# Patient Record
Sex: FEMALE | URBAN - METROPOLITAN AREA
[De-identification: names, ages, dates, MRNs, and addresses within clinical notes are randomized per-mention and may not be internally consistent; named-entity substitution may affect disease eponyms.]

---

## 2020-05-21 PROBLEM — 266435005 GASTRO-ESOPHAGEAL REFLUX DISEASE WITHOUT ESOPHAGITIS: Status: ACTIVE | Noted: 2020-05-21

## 2022-04-30 ENCOUNTER — TELEPHONE ENCOUNTER (OUTPATIENT)
Dept: URBAN - METROPOLITAN AREA CLINIC 121 | Facility: CLINIC | Age: 57
End: 2022-04-30

## 2022-05-01 ENCOUNTER — TELEPHONE ENCOUNTER (OUTPATIENT)
Dept: URBAN - METROPOLITAN AREA CLINIC 121 | Facility: CLINIC | Age: 57
End: 2022-05-01

## 2022-05-01 RX ORDER — PANTOPRAZOLE SODIUM 40 MG/1
1 TABLET PO QD TABLET, DELAYED RELEASE ORAL
Status: ACTIVE | COMMUNITY
Start: 2020-05-22

## 2023-02-13 ENCOUNTER — WEB ENCOUNTER (OUTPATIENT)
Dept: URBAN - METROPOLITAN AREA CLINIC 50 | Facility: CLINIC | Age: 58
End: 2023-02-13

## 2023-02-13 ENCOUNTER — LAB OUTSIDE AN ENCOUNTER (OUTPATIENT)
Dept: URBAN - METROPOLITAN AREA CLINIC 50 | Facility: CLINIC | Age: 58
End: 2023-02-13

## 2023-02-13 ENCOUNTER — OFFICE VISIT (OUTPATIENT)
Dept: URBAN - METROPOLITAN AREA CLINIC 50 | Facility: CLINIC | Age: 58
End: 2023-02-13
Payer: COMMERCIAL

## 2023-02-13 VITALS
HEIGHT: 64 IN | TEMPERATURE: 97 F | WEIGHT: 206 LBS | SYSTOLIC BLOOD PRESSURE: 160 MMHG | BODY MASS INDEX: 35.17 KG/M2 | DIASTOLIC BLOOD PRESSURE: 72 MMHG | HEART RATE: 71 BPM

## 2023-02-13 DIAGNOSIS — Z12.11 COLON CANCER SCREENING: ICD-10-CM

## 2023-02-13 DIAGNOSIS — R16.0 HEPATOMEGALY: ICD-10-CM

## 2023-02-13 DIAGNOSIS — Z83.71 FAMILY HISTORY OF COLONIC POLYPS: ICD-10-CM

## 2023-02-13 DIAGNOSIS — K63.89 EPIPLOIC APPENDAGITIS: ICD-10-CM

## 2023-02-13 DIAGNOSIS — R10.84 GENERALIZED ABDOMINAL PAIN: ICD-10-CM

## 2023-02-13 PROBLEM — 429969003 FAMILY HISTORY OF POLYP OF COLON: Status: ACTIVE | Noted: 2020-05-21

## 2023-02-13 PROCEDURE — 99204 OFFICE O/P NEW MOD 45 MIN: CPT

## 2023-02-13 RX ORDER — IBUPROFEN 800 MG/1
1 TABLET WITH FOOD OR MILK AS NEEDED TABLET, FILM COATED ORAL
Qty: 20 | Refills: 0 | OUTPATIENT
Start: 2023-02-13 | End: 2023-02-23

## 2023-02-13 RX ORDER — PANTOPRAZOLE SODIUM 40 MG/1
1 TABLET PO QD TABLET, DELAYED RELEASE ORAL
Status: ACTIVE | COMMUNITY
Start: 2020-05-22

## 2023-02-13 RX ORDER — SODIUM, POTASSIUM,MAG SULFATES 17.5-3.13G
354ML SOLUTION, RECONSTITUTED, ORAL ORAL
Qty: 1 | Refills: 0 | OUTPATIENT
Start: 2023-02-13 | End: 2023-02-15

## 2023-02-13 RX ORDER — PANTOPRAZOLE SODIUM 20 MG/1
1 TABLET TABLET, DELAYED RELEASE ORAL ONCE A DAY
Qty: 10 TABLET | Refills: 0 | OUTPATIENT
Start: 2023-02-13

## 2023-02-13 NOTE — PHYSICAL EXAM GASTROINTESTINAL
Abdomen soft, tender throughout the abdomen, nondistended, no masses palpable, no guarding or rigidity, normal bowel sounds, Liver and Spleen no hepatosplenomegaly, Rectal deferred

## 2023-02-13 NOTE — PHYSICAL EXAM CONSTITUTIONAL:
well developed, well nourished, patient is leaning over in pain, ambulating without difficulty, normal communication ability

## 2023-02-27 ENCOUNTER — OFFICE VISIT (OUTPATIENT)
Dept: URBAN - METROPOLITAN AREA CLINIC 12 | Facility: CLINIC | Age: 58
End: 2023-02-27

## 2023-02-27 RX ORDER — PANTOPRAZOLE SODIUM 40 MG/1
1 TABLET PO QD TABLET, DELAYED RELEASE ORAL
Status: ACTIVE | COMMUNITY
Start: 2020-05-22

## 2023-02-27 RX ORDER — PANTOPRAZOLE SODIUM 20 MG/1
1 TABLET TABLET, DELAYED RELEASE ORAL ONCE A DAY
Qty: 10 TABLET | Refills: 0 | Status: ACTIVE | COMMUNITY
Start: 2023-02-13

## 2023-02-27 NOTE — HPI-TODAY'S VISIT:
58 yo F here for evaluation of abd pain. She saw Elana Enciso PA-C 2 weeks ago for similar Sx. Upper abd pain. She was prescribed Ibuprofen 800mg PO BID Also was scheduled for first screening colonoscopy, scheduled for mid March with Dr. Avelar.

## 2023-03-01 ENCOUNTER — TELEPHONE ENCOUNTER (OUTPATIENT)
Dept: URBAN - METROPOLITAN AREA CLINIC 50 | Facility: CLINIC | Age: 58
End: 2023-03-01

## 2023-03-01 ENCOUNTER — OFFICE VISIT (OUTPATIENT)
Dept: URBAN - METROPOLITAN AREA CLINIC 12 | Facility: CLINIC | Age: 58
End: 2023-03-01

## 2023-03-08 ENCOUNTER — TELEPHONE ENCOUNTER (OUTPATIENT)
Dept: URBAN - METROPOLITAN AREA CLINIC 50 | Facility: CLINIC | Age: 58
End: 2023-03-08

## 2023-03-08 ENCOUNTER — ERX REFILL RESPONSE (OUTPATIENT)
Dept: URBAN - METROPOLITAN AREA CLINIC 50 | Facility: CLINIC | Age: 58
End: 2023-03-08

## 2023-03-08 RX ORDER — SODIUM, POTASSIUM,MAG SULFATES 17.5-3.13G
354ML SOLUTION, RECONSTITUTED, ORAL ORAL
Qty: 1 | Refills: 0 | OUTPATIENT

## 2023-03-15 ENCOUNTER — OFFICE VISIT (OUTPATIENT)
Dept: URBAN - METROPOLITAN AREA SURGERY CENTER 18 | Facility: SURGERY CENTER | Age: 58
End: 2023-03-15
Payer: COMMERCIAL

## 2023-03-15 ENCOUNTER — CLAIMS CREATED FROM THE CLAIM WINDOW (OUTPATIENT)
Dept: URBAN - METROPOLITAN AREA CLINIC 4 | Facility: CLINIC | Age: 58
End: 2023-03-15
Payer: COMMERCIAL

## 2023-03-15 DIAGNOSIS — D12.5 ADENOMA OF SIGMOID COLON: ICD-10-CM

## 2023-03-15 DIAGNOSIS — D12.3 ADENOMA OF TRANSVERSE COLON: ICD-10-CM

## 2023-03-15 DIAGNOSIS — Z12.11 COLON CANCER SCREENING: ICD-10-CM

## 2023-03-15 DIAGNOSIS — K63.89 OTHER SPECIFIED DISEASES OF INTESTINE: ICD-10-CM

## 2023-03-15 PROCEDURE — G8907 PT DOC NO EVENTS ON DISCHARG: HCPCS | Performed by: INTERNAL MEDICINE

## 2023-03-15 PROCEDURE — 45380 COLONOSCOPY AND BIOPSY: CPT | Performed by: INTERNAL MEDICINE

## 2023-03-15 PROCEDURE — 88305 TISSUE EXAM BY PATHOLOGIST: CPT | Performed by: PATHOLOGY

## 2023-03-15 RX ORDER — PANTOPRAZOLE SODIUM 40 MG/1
1 TABLET PO QD TABLET, DELAYED RELEASE ORAL
Status: ACTIVE | COMMUNITY
Start: 2020-05-22

## 2023-03-15 RX ORDER — PANTOPRAZOLE SODIUM 20 MG/1
1 TABLET TABLET, DELAYED RELEASE ORAL ONCE A DAY
Qty: 10 TABLET | Refills: 0 | Status: ACTIVE | COMMUNITY
Start: 2023-02-13

## 2023-03-15 RX ORDER — SODIUM, POTASSIUM,MAG SULFATES 17.5-3.13G
354ML SOLUTION, RECONSTITUTED, ORAL ORAL
Qty: 1 | Refills: 0 | Status: ACTIVE | COMMUNITY

## 2023-03-30 ENCOUNTER — DASHBOARD ENCOUNTERS (OUTPATIENT)
Age: 58
End: 2023-03-30

## 2023-03-30 ENCOUNTER — OFFICE VISIT (OUTPATIENT)
Dept: URBAN - METROPOLITAN AREA CLINIC 50 | Facility: CLINIC | Age: 58
End: 2023-03-30
Payer: COMMERCIAL

## 2023-03-30 VITALS
HEIGHT: 64 IN | TEMPERATURE: 97.6 F | HEART RATE: 79 BPM | WEIGHT: 208 LBS | DIASTOLIC BLOOD PRESSURE: 80 MMHG | BODY MASS INDEX: 35.51 KG/M2 | SYSTOLIC BLOOD PRESSURE: 144 MMHG

## 2023-03-30 DIAGNOSIS — R12 HEARTBURN: ICD-10-CM

## 2023-03-30 DIAGNOSIS — Z83.71 FAMILY HISTORY OF COLONIC POLYPS: ICD-10-CM

## 2023-03-30 DIAGNOSIS — D36.9 TUBULAR ADENOMA: ICD-10-CM

## 2023-03-30 DIAGNOSIS — R10.13 EPIGASTRIC ABDOMINAL PAIN: ICD-10-CM

## 2023-03-30 DIAGNOSIS — R16.0 HEPATOMEGALY: ICD-10-CM

## 2023-03-30 DIAGNOSIS — R10.84 GENERALIZED ABDOMINAL PAIN: ICD-10-CM

## 2023-03-30 DIAGNOSIS — K63.89 EPIPLOIC APPENDAGITIS: ICD-10-CM

## 2023-03-30 PROCEDURE — 99213 OFFICE O/P EST LOW 20 MIN: CPT

## 2023-03-30 RX ORDER — FAMOTIDINE 40 MG/1
1 TABLET AT BEDTIME TABLET, FILM COATED ORAL ONCE A DAY
Qty: 30 TABLET | Refills: 5 | OUTPATIENT
Start: 2023-03-30

## 2023-03-30 NOTE — HPI-TODAY'S VISIT:
56 yo F presents to the clinic for fu  Took ibuprofen and ppi bid for 10 days  Feels better  But slowly came back  + abdominal pain - epigastric  + nausea - infrequent  No vomiting  Fluctuating bowels - since colonoscopy  + diarrhea  + constipation  No BRBPR, no melena  + heartburn + reflux

## 2024-08-30 ENCOUNTER — OFFICE VISIT (OUTPATIENT)
Dept: URBAN - METROPOLITAN AREA CLINIC 96 | Facility: CLINIC | Age: 59
End: 2024-08-30

## 2024-08-30 VITALS
DIASTOLIC BLOOD PRESSURE: 80 MMHG | HEIGHT: 64 IN | BODY MASS INDEX: 28.17 KG/M2 | WEIGHT: 165 LBS | HEART RATE: 66 BPM | TEMPERATURE: 98.1 F | SYSTOLIC BLOOD PRESSURE: 160 MMHG

## 2024-08-30 NOTE — HPI-SCREENING
56 yo F presents to the clinic for abdoninal pain for te past 5 days  + abdominal pain - since 2/9/23, pain is un the upper central abdomen Pain is like a pressure sensation - had her doubled over  + nasuea, no vomiting  Pain relieved by leaning over chair  Laying down does not help pain  Staying very still helps  Pain increased by drinking water No dyspahgia  Bowels - moving normally  No BRBPR, no melena Went to [facility]gent [facility] 2/10/23 (had CT, and was given injection) CT scan: oeppiglotic appendigitis, hepatomegaly  Was given oral cooktail at Urgent Care - maded her constipated last colon: neverr

## 2024-08-30 NOTE — HPI-TODAY'S VISIT:
takes ibuprofen 800  mg, 2 pills most days of the week if not daily for past few weeks. has been having abodminal pain for 2 weeks epigastric pain no melena takes ibuprofen for abodminal pain    ======================================== 56 yo F presents to the clinic for fu  Took ibuprofen and ppi bid for 10 days  Feels better  But slowly came back  + abdominal pain - epigastric  + nausea - infrequent  No vomiting  Fluctuating bowels - since colonoscopy  + diarrhea  + constipation  No BRBPR, no melena  + heartburn + reflux 56 yo F here for evaluation of abd pain. She saw Elana Enciso PA-C 2 weeks ago for similar Sx. Upper abd pain. She was prescribed Ibuprofen 800mg PO BID Also was scheduled for first screening colonoscopy, scheduled for mid March with Dr. Avelar.

## 2024-09-03 ENCOUNTER — TELEPHONE ENCOUNTER (OUTPATIENT)
Dept: URBAN - METROPOLITAN AREA CLINIC 96 | Facility: CLINIC | Age: 59
End: 2024-09-03

## 2024-09-03 ENCOUNTER — OFFICE VISIT (OUTPATIENT)
Dept: URBAN - METROPOLITAN AREA MEDICAL CENTER 28 | Facility: MEDICAL CENTER | Age: 59
End: 2024-09-03
Payer: COMMERCIAL

## 2024-09-03 DIAGNOSIS — K29.50 CHRONIC GASTRITIS: ICD-10-CM

## 2024-09-03 DIAGNOSIS — R10.13 ABDOMINAL PAIN, EPIGASTRIC: ICD-10-CM

## 2024-09-03 PROCEDURE — 43239 EGD BIOPSY SINGLE/MULTIPLE: CPT | Performed by: INTERNAL MEDICINE

## 2024-09-16 ENCOUNTER — TELEPHONE ENCOUNTER (OUTPATIENT)
Dept: URBAN - METROPOLITAN AREA CLINIC 96 | Facility: CLINIC | Age: 59
End: 2024-09-16

## 2024-09-16 PROBLEM — 8493009: Status: ACTIVE | Noted: 2024-09-16

## 2024-09-19 ENCOUNTER — TELEPHONE ENCOUNTER (OUTPATIENT)
Dept: URBAN - METROPOLITAN AREA CLINIC 96 | Facility: CLINIC | Age: 59
End: 2024-09-19

## 2024-09-23 ENCOUNTER — TELEPHONE ENCOUNTER (OUTPATIENT)
Dept: URBAN - METROPOLITAN AREA CLINIC 96 | Facility: CLINIC | Age: 59
End: 2024-09-23

## 2024-09-24 LAB — H PYLORI BREATH TEST: NOT DETECTED

## 2024-10-04 ENCOUNTER — OFFICE VISIT (OUTPATIENT)
Dept: URBAN - METROPOLITAN AREA TELEHEALTH 2 | Facility: TELEHEALTH | Age: 59
End: 2024-10-04
Payer: COMMERCIAL

## 2024-10-04 VITALS — WEIGHT: 170 LBS | HEIGHT: 64 IN | BODY MASS INDEX: 29.02 KG/M2 | RESPIRATION RATE: 18 BRPM

## 2024-10-04 DIAGNOSIS — R10.13 EPIGASTRIC ABDOMINAL PAIN: ICD-10-CM

## 2024-10-04 DIAGNOSIS — K63.89 EPIPLOIC APPENDAGITIS: ICD-10-CM

## 2024-10-04 DIAGNOSIS — R10.84 GENERALIZED ABDOMINAL PAIN: ICD-10-CM

## 2024-10-04 DIAGNOSIS — K31.A0 GASTRIC INTESTINAL METAPLASIA: ICD-10-CM

## 2024-10-04 DIAGNOSIS — D36.9 TUBULAR ADENOMA: ICD-10-CM

## 2024-10-04 DIAGNOSIS — K31.89 EROSIVE GASTROPATHY: ICD-10-CM

## 2024-10-04 PROBLEM — 413216002: Status: ACTIVE | Noted: 2024-10-04

## 2024-10-04 PROBLEM — 102614006: Status: ACTIVE | Noted: 2024-10-04

## 2024-10-04 PROBLEM — 116289008: Status: ACTIVE | Noted: 2024-10-04

## 2024-10-04 PROBLEM — 79922009: Status: ACTIVE | Noted: 2024-10-04

## 2024-10-04 PROBLEM — 14720231000119109: Status: ACTIVE | Noted: 2024-10-04

## 2024-10-04 PROBLEM — 444408007: Status: ACTIVE | Noted: 2024-10-04

## 2024-10-04 PROBLEM — 72519002: Status: ACTIVE | Noted: 2024-10-04

## 2024-10-04 PROBLEM — 16331000: Status: ACTIVE | Noted: 2024-10-04

## 2024-10-04 PROCEDURE — 99214 OFFICE O/P EST MOD 30 MIN: CPT

## 2024-10-04 RX ORDER — VONOPRAZAN FUMARATE 26.72 MG/1
1 TABLET TABLET ORAL ONCE A DAY
Qty: 30 | Refills: 11 | OUTPATIENT
Start: 2024-10-04 | End: 2025-09-29

## 2024-10-04 NOTE — HPI-OTHER HISTORIES
Previously 8/2024 with Dr. Garcia: takes ibuprofen 800  mg, 2 pills most days of the week if not daily for past few weeks. has been having abodminal pain for 2 weeks epigastric pain no melena takes ibuprofen for abodminal pain  ======================================== 58 yo F presents to the clinic for fu Took ibuprofen and ppi bid for 10 days Feels better But slowly came back + abdominal pain - epigastric + nausea - infrequent No vomiting Fluctuating bowels - since colonoscopy + diarrhea + constipation No BRBPR, no melena + heartburn + reflux 58 yo F here for evaluation of abd pain. She saw Elana Enciso PA-C 2 weeks ago for similar Sx. Upper abd pain. She was prescribed Ibuprofen 800mg PO BID Also was scheduled for first screening colonoscopy, scheduled for mid March with Dr. Avelar.

## 2024-10-04 NOTE — HPI-SCREENING
58 yo F presents to the clinic for abdoninal pain for te past 5 days  + abdominal pain - since 2/9/23, pain is un the upper central abdomen Pain is like a pressure sensation - had her doubled over  + nasuea, no vomiting  Pain relieved by leaning over chair  Laying down does not help pain  Staying very still helps  Pain increased by drinking water No dyspahgia  Bowels - moving normally  No BRBPR, no melena Went to Urgent Care 2/10/23 (had CT, and was given injection) CT scan: oeppiglotic appendigitis, hepatomegaly  Was given oral cooktail at Urgent Care - maded her constipated last colon: neverr

## 2024-10-04 NOTE — HPI-TODAY'S VISIT:
59 year old female presents for follow-up after EGD. Continues to have abdominal pain.  Not as much as before. Taking Voquezna daily. Avoiding greasy and spicy foods. Avoiding NSAIDs.  Admits to drinking caffeine. h/o RA - pt says it is based on symptoms - was told her blood tests were negative. She is in the process of changing Rheumatologisits. . H. pylori breath test, 9/24/2024: Negative. . EGD, 9/3/2024, Dr. Garcia: Normal duodenum.  Diffuse moderate inflammation with congestion, erosions, erythema, friability, and granularity in entire stomach.  Normal esophagus.  Stomach biopsies with chronic focally active gastritis and intestinal metaplasia without dysplasia, no evidence of H. pylori.

## 2024-10-24 ENCOUNTER — OFFICE VISIT (OUTPATIENT)
Dept: URBAN - METROPOLITAN AREA CLINIC 96 | Facility: CLINIC | Age: 59
End: 2024-10-24

## 2024-10-31 ENCOUNTER — OFFICE VISIT (OUTPATIENT)
Dept: URBAN - METROPOLITAN AREA CLINIC 96 | Facility: CLINIC | Age: 59
End: 2024-10-31

## 2024-11-11 ENCOUNTER — TELEPHONE ENCOUNTER (OUTPATIENT)
Dept: URBAN - METROPOLITAN AREA CLINIC 96 | Facility: CLINIC | Age: 59
End: 2024-11-11

## 2024-11-11 RX ORDER — VONOPRAZAN FUMARATE 26.72 MG/1
1 TABLET TABLET ORAL ONCE A DAY
Qty: 30 | Refills: 5
Start: 2024-10-04 | End: 2025-05-17

## 2025-02-04 NOTE — HPI-SCREENING
56 yo F presents to the clinic for abdoninal pain for te past 5 days  + abdominal pain - since 2/9/23, pain is un the upper central abdomen Pain is like a pressure sensation - had her doubled over  + nasuea, no vomiting  Pain relieved by leaning over chair  Laying down does not help pain  Staying very still helps  Pain increased by drinking water No dyspahgia  Bowels - moving normally  No BRBPR, no melena Went to Urgent Care 2/10/23 (had CT, and was given injection) CT scan: oeppiglotic appendigitis, hepatomegaly  Was given oral cooktail at Urgent Care - maded her constipated last colon: neverr Caller: Patient    Doctor: Dr. Espinosa    Reason for call: Patient calling because Kat ordered PT and a back brace for her.  She was told that she would need to come into the office to get fitted for the brace and would like to know what day someone is there for that.    Please advise.    Call back#: [phone]

## 2025-02-25 ENCOUNTER — OFFICE VISIT (OUTPATIENT)
Dept: URBAN - METROPOLITAN AREA CLINIC 50 | Facility: CLINIC | Age: 60
End: 2025-02-25